# Patient Record
Sex: FEMALE | Race: AMERICAN INDIAN OR ALASKA NATIVE
[De-identification: names, ages, dates, MRNs, and addresses within clinical notes are randomized per-mention and may not be internally consistent; named-entity substitution may affect disease eponyms.]

---

## 2022-06-11 ENCOUNTER — HOSPITAL ENCOUNTER (EMERGENCY)
Dept: HOSPITAL 5 - ED | Age: 32
LOS: 1 days | Discharge: LEFT BEFORE DISCHARGE | End: 2022-06-12
Payer: SELF-PAY

## 2022-06-11 VITALS — DIASTOLIC BLOOD PRESSURE: 82 MMHG | SYSTOLIC BLOOD PRESSURE: 144 MMHG

## 2022-06-11 DIAGNOSIS — Z53.21: ICD-10-CM

## 2022-06-11 DIAGNOSIS — M79.672: Primary | ICD-10-CM

## 2022-06-11 NOTE — XRAY REPORT
XR ankle 3+V LT



INDICATION / CLINICAL INFORMATION: Left ankle /foot pain



COMPARISON: None available.



AP, LATERAL, AND OBLIQUE VIEWS LEFT ANKLE

 

FINDINGS:



No fracture, dislocation, or significant soft tissue abnormality.



IMPRESSION:

1. No significant abnormality of the left ankle.





Signer Name: Nolan Yost II, MD 

Signed: 6/11/2022 10:30 PM

Workstation Name: [id number]

## 2022-08-17 ENCOUNTER — HOSPITAL ENCOUNTER (EMERGENCY)
Dept: HOSPITAL 5 - ED | Age: 32
LOS: 1 days | Discharge: LEFT BEFORE DISCHARGE | End: 2022-08-18
Payer: SELF-PAY

## 2022-08-17 VITALS — SYSTOLIC BLOOD PRESSURE: 132 MMHG | DIASTOLIC BLOOD PRESSURE: 81 MMHG

## 2022-08-17 DIAGNOSIS — Z53.21: ICD-10-CM

## 2022-08-17 DIAGNOSIS — R53.1: Primary | ICD-10-CM
